# Patient Record
Sex: FEMALE | Race: WHITE | URBAN - METROPOLITAN AREA
[De-identification: names, ages, dates, MRNs, and addresses within clinical notes are randomized per-mention and may not be internally consistent; named-entity substitution may affect disease eponyms.]

---

## 2017-02-15 ENCOUNTER — DOCUMENTATION ONLY (OUTPATIENT)
Dept: OBGYN CLINIC | Age: 32
End: 2017-02-15

## 2017-02-15 NOTE — PROGRESS NOTES
Spoke with patient today regarding another invoice received for $20 copay. Informed her that I had correspondence with Dani Becker from [facility] and patient should not be getting more bills for an extra $20 for a copay. [facility] rep today to find out if her balance can be fixed because it is incorrect. Patient has been informed that I am working on this.

## 2017-02-22 ENCOUNTER — DOCUMENTATION ONLY (OUTPATIENT)
Dept: OBGYN CLINIC | Age: 32
End: 2017-02-22

## 2017-02-28 ENCOUNTER — DOCUMENTATION ONLY (OUTPATIENT)
Dept: OBGYN CLINIC | Age: 32
End: 2017-02-28

## 2017-02-28 NOTE — PROGRESS NOTES
Patient called again today regarding her $20 extra copay. I inquired asking if she received my detailed voicemail message from last week. She confirmed that she did receive it. Her complaint was that she wouldn't have chosen this office if she knew that [facility] wasn't considered \"out of network\" under [employer]. I explained to her that we weren't aware of that specificity of her insurance and just knew that our providers were under the [facility] network. I explained to her twice that [facility] was not in network at the TOS but is now in network as of 1/1/17. Dr. Samina Dalal has been in network for a number of years. I apologized for anyone not knowing this detail. I did reiterate that [facility] was now in network so she is welcome to return to the office in the future.

## 2018-11-12 ENCOUNTER — HOSPITAL ENCOUNTER (OUTPATIENT)
Dept: LAB | Age: 33
Discharge: HOME | End: 2018-11-12
Payer: COMMERCIAL

## 2018-11-12 ENCOUNTER — OFFICE VISIT (OUTPATIENT)
Dept: OBGYN CLINIC | Age: 33
End: 2018-11-12

## 2018-11-12 VITALS
BODY MASS INDEX: 23.14 KG/M2 | SYSTOLIC BLOOD PRESSURE: 112 MMHG | RESPIRATION RATE: 18 BRPM | WEIGHT: 144 LBS | HEART RATE: 67 BPM | DIASTOLIC BLOOD PRESSURE: 73 MMHG | HEIGHT: 66 IN

## 2018-11-12 DIAGNOSIS — Z01.419 ENCOUNTER FOR GYNECOLOGICAL EXAMINATION WITHOUT ABNORMAL FINDING: Primary | ICD-10-CM

## 2018-11-12 PROCEDURE — 87624 HPV HI-RISK TYP POOLED RSLT: CPT

## 2018-11-12 PROCEDURE — 88175 CYTOPATH C/V AUTO FLUID REDO: CPT

## 2018-11-12 PROCEDURE — 87625 HPV TYPES 16 & 18 ONLY: CPT

## 2018-11-12 RX ORDER — NORETHINDRONE ACETATE AND ETHINYL ESTRADIOL 1MG-20(21)
KIT ORAL
COMMUNITY
End: 2019-01-10 | Stop reason: ALTCHOICE

## 2018-11-12 RX ORDER — NORETHINDRONE ACETATE AND ETHINYL ESTRADIOL 1MG-20(21)
1 KIT ORAL DAILY
Qty: 3 PACKAGE | Refills: 3 | Status: SHIPPED | OUTPATIENT
Start: 2018-11-12 | End: 2019-01-10 | Stop reason: ALTCHOICE

## 2018-11-12 NOTE — PROGRESS NOTES
Subjective:   35 y.o. female for Well Woman Check. No LMP recorded. Social History: single partner, contraception - OCP (Oral Contraceptive Pills). Pertinent past medical hstory: no history of HTN, DVT, CAD, DM, liver disease, migraines or smoking. Current Outpatient Medications   Medication Sig Dispense Refill    norethindrone-ethinyl estradiol (BLISOVI FE 1/20, 28,) 1 mg-20 mcg (21)/75 mg (7) tab Take  by mouth.  norethindrone-ethinyl estradiol (BLISOVI FE 1/20, 28,) 1 mg-20 mcg (21)/75 mg (7) tab Take 1 Tab by mouth daily. 3 Package 3     No Known Allergies  Past Medical History:   Diagnosis Date    Abnormal Papanicolaou smear of cervix 2011    colpo    Ovarian cyst      Past Surgical History:   Procedure Laterality Date    HX GYN  2015    ovarian cyst removed    HX TONSILLECTOMY  5years old     Family History   Problem Relation Age of Onset    No Known Problems Mother     No Known Problems Father     Thyroid Disease Maternal Grandmother     No Known Problems Maternal Grandfather     No Known Problems Paternal Grandmother     No Known Problems Paternal Grandfather      Social History     Tobacco Use    Smoking status: Never Smoker    Smokeless tobacco: Never Used   Substance Use Topics    Alcohol use: Yes     Alcohol/week: 0.6 oz     Types: 1 Cans of beer per week     Frequency: 2-3 times a week     Drinks per session: 1 or 2     Binge frequency: Less than monthly        ROS:  Feeling well. No dyspnea or chest pain on exertion. No abdominal pain, change in bowel habits, black or bloody stools. No urinary tract symptoms. GYN ROS: normal menses, no abnormal bleeding, pelvic pain or discharge, no breast pain or new or enlarging lumps on self exam. No neurological complaints. Objective:     Visit Vitals  /73   Pulse 67   Resp 18   Ht 5' 6\" (1.676 m)   Wt 144 lb (65.3 kg)   BMI 23.24 kg/m²     The patient appears well, alert, oriented x 3, in no distress.   ENT normal.  Neck supple. No adenopathy or thyromegaly. KYLER. Lungs are clear, good air entry, no wheezes, rhonchi or rales. S1 and S2 normal, no murmurs, regular rate and rhythm. Abdomen soft without tenderness, guarding, mass or organomegaly. Extremities show no edema, normal peripheral pulses. Neurological is normal, no focal findings.     BREAST EXAM: breasts appear normal, no suspicious masses, no skin or nipple changes or axillary nodes    PELVIC EXAM: normal external genitalia, vulva, vagina, cervix, uterus and adnexa, PAP: Pap smear done today, HPV test    Assessment/Plan:   well woman  no contraindication to continue use of oral contraceptives  pap smear  return annually or prn

## 2019-01-08 ENCOUNTER — TELEPHONE (OUTPATIENT)
Dept: OBGYN CLINIC | Age: 34
End: 2019-01-08

## 2019-01-08 NOTE — TELEPHONE ENCOUNTER
Please call the patient and tell her to continue the OCPs, call if the bleeding becomes very heavy or if it starts to happen with every cycle.

## 2019-01-09 NOTE — TELEPHONE ENCOUNTER
Spoke with this patient concerning abnormal vaginal bleeding , patient is requesting a different medication because this bleeding is different for her ,please advise

## 2019-01-10 RX ORDER — NORETHINDRONE ACETATE AND ETHINYL ESTRADIOL 1.5-30(21)
1 KIT ORAL DAILY
Qty: 3 PACKAGE | Refills: 3 | Status: CANCELLED | OUTPATIENT
Start: 2019-01-10

## 2019-01-21 ENCOUNTER — TELEPHONE (OUTPATIENT)
Dept: OBGYN CLINIC | Age: 34
End: 2019-01-21

## 2019-01-21 NOTE — TELEPHONE ENCOUNTER
Spoke with this patient , She continues to have heavy bleeding , she thinks maybe she needs a different RX  Appointment offered  She declined for now , patient is requesting a call form MD

## 2019-09-26 ENCOUNTER — HOSPITAL ENCOUNTER (OUTPATIENT)
Dept: LAB | Age: 34
Discharge: HOME | End: 2019-09-26
Payer: COMMERCIAL

## 2019-09-26 PROCEDURE — 88175 CYTOPATH C/V AUTO FLUID REDO: CPT

## 2019-09-26 PROCEDURE — 87624 HPV HI-RISK TYP POOLED RSLT: CPT

## 2022-05-06 ENCOUNTER — HOSPITAL ENCOUNTER (OUTPATIENT)
Dept: LAB | Age: 37
Discharge: HOME | End: 2022-05-06
Payer: COMMERCIAL

## 2022-05-06 PROCEDURE — 88175 CYTOPATH C/V AUTO FLUID REDO: CPT

## 2022-05-06 PROCEDURE — 87624 HPV HI-RISK TYP POOLED RSLT: CPT

## 2023-01-26 NOTE — PROGRESS NOTES
MsAdal Zofia Jennifer called earlier in the week regarding her bill for a second copay. I confirmed with [address]  today that Anni Nondalton is now a provider in the [facility] as of 1/1/17. This infomration has been left on the patient's voicemail. Since Anni Chicas was not apart of this network last year, her bill for any additional $20 is correct.  For future visits her copay will actually be $20 instead of $40.
6

## 2023-05-17 RX ORDER — TRETINOIN 1 MG/G
CREAM TOPICAL
COMMUNITY

## 2023-05-17 RX ORDER — NORETHINDRONE ACETATE AND ETHINYL ESTRADIOL 1.5-30(21)
1 KIT ORAL DAILY
COMMUNITY
Start: 2016-09-20